# Patient Record
Sex: FEMALE | Race: BLACK OR AFRICAN AMERICAN | NOT HISPANIC OR LATINO | Employment: UNEMPLOYED | URBAN - METROPOLITAN AREA
[De-identification: names, ages, dates, MRNs, and addresses within clinical notes are randomized per-mention and may not be internally consistent; named-entity substitution may affect disease eponyms.]

---

## 2017-09-27 DIAGNOSIS — M76.72 PERONEAL TENDINITIS OF LEFT LOWER LEG: Primary | ICD-10-CM

## 2017-10-26 ENCOUNTER — HOSPITAL ENCOUNTER (OUTPATIENT)
Dept: PREADMISSION TESTING | Facility: HOSPITAL | Age: 42
Discharge: HOME | End: 2017-10-26
Attending: OBSTETRICS & GYNECOLOGY
Payer: MEDICAID

## 2017-10-26 ENCOUNTER — ANESTHESIA EVENT (OUTPATIENT)
Dept: SURGERY | Facility: HOSPITAL | Age: 42
End: 2017-10-26
Payer: MEDICAID

## 2017-10-26 VITALS
RESPIRATION RATE: 16 BRPM | DIASTOLIC BLOOD PRESSURE: 62 MMHG | BODY MASS INDEX: 33.66 KG/M2 | OXYGEN SATURATION: 99 % | SYSTOLIC BLOOD PRESSURE: 139 MMHG | HEART RATE: 44 BPM | WEIGHT: 190 LBS | HEIGHT: 63 IN

## 2017-10-26 DIAGNOSIS — I10 ESSENTIAL HYPERTENSION: ICD-10-CM

## 2017-10-26 DIAGNOSIS — I21.4 NON-ST ELEVATION (NSTEMI) MYOCARDIAL INFARCTION: ICD-10-CM

## 2017-10-26 RX ORDER — METFORMIN HYDROCHLORIDE 500 MG/1
500 TABLET ORAL 2 TIMES DAILY WITH MEALS
COMMUNITY

## 2017-10-26 RX ORDER — SODIUM CHLORIDE, SODIUM LACTATE, POTASSIUM CHLORIDE, CALCIUM CHLORIDE 600; 310; 30; 20 MG/100ML; MG/100ML; MG/100ML; MG/100ML
INJECTION, SOLUTION INTRAVENOUS CONTINUOUS
Status: CANCELLED | OUTPATIENT
Start: 2017-10-26

## 2017-10-26 RX ORDER — ASPIRIN 81 MG/1
81 TABLET ORAL DAILY
COMMUNITY

## 2017-10-26 RX ORDER — LIDOCAINE HYDROCHLORIDE 10 MG/ML
1 INJECTION, SOLUTION EPIDURAL; INFILTRATION; INTRACAUDAL; PERINEURAL ONCE
Status: CANCELLED | OUTPATIENT
Start: 2017-10-26 | End: 2017-10-26

## 2017-10-26 NOTE — PRE-PROCEDURE INSTRUCTIONS
[facility] - [phone]    Allergies, medical, surgical, family and psychosocial histories reviewed with patient. Periop plan of care reviewed. Preop instructions given, including medications to take and to hold. Time allotted for questions to be addressed.  Patient verbalized understanding.

## 2017-10-26 NOTE — ANESTHESIA PREPROCEDURE EVALUATION
10/26/2017  Isaura Yarbrough is a 42 y.o., female is scheduled for hysteroscopy with endometrial ablation under GETA on 10/31/2017.    Outside Cardiology note with EKG NSR reviewed from 9//07/2017, 2D echo w/CFD 3/2016 EF 60-65% reviewed and in patient chart.  Additional NM stress test with [facility] from 2014 reviewed in pt chart.    Past Surgical History:   Procedure Laterality Date    CARDIAC CATHETERIZATION  2014    [facility]    CHOLECYSTECTOMY  2012    FOOT SURGERY Left      BP Readings from Last 3 Encounters:   10/26/17 139/62     Anesthesia Evaluation    I have reviewed the Patient Summary Reports.    I have reviewed the Nursing Notes.   I have reviewed the Medications.     Review of Systems  Anesthesia Hx:  No problems with previous Anesthesia  History of prior surgery of interest to airway management or planning: Previous anesthesia: General  Denies Personal Hx of Anesthesia complications.   Social:  Non-Smoker, No Alcohol Use    Hematology/Oncology:  Hematology Normal      Hematology Comments: On ASA for secondary prevention; pt will stop ASA starting 10/26/2017 and resume 24 hours after procedure; pt verbalized understanding   EENT/Dental:EENT/Dental Normal   Cardiovascular:   Exercise tolerance: good Hypertension, well controlled Past MI (outside NM stress test 2014 in pt chart) CAD   Denies CABG/stent.  Denies Dysrhythmias.   Denies Angina.     ECG has been reviewed.  Functional Capacity 3 METS    Pulmonary:   Denies Shortness of breath.    Renal/:  Renal/ Normal     Hepatic/GI:  Hepatic/GI Normal    OB/GYN/PEDS:  Heavy uterine bleeding   Neurological:  Neurology Normal Denies Seizures.    Endocrine:   Diabetes, type 2  Diabetes, Type 2 Diabetes , controlled by oral hypoglycemics. on does not recall last A1c.        Physical Exam  General:  Obesity    Airway/Jaw/Neck:  Airway Findings: Mouth Opening:  Normal Tongue: Normal  General Airway Assessment: Adult  Mallampati: II  TM Distance: Normal, at least 6 cm        Eyes/Ears/Nose:  EYES/EARS/NOSE FINDINGS: Normal   Dental:  Dental Findings: Periodontal disease, Mild   Chest/Lungs:  Chest/Lungs Clear    Heart/Vascular:  Heart Findings: Normal Heart murmur: negative    Abdomen:  Abdomen Findings: Normal      Mental Status:  Mental Status Findings: Normal        Anesthesia Plan  Type of Anesthesia, risks & benefits discussed:  Anesthesia Type:  general  Patient's Preference:   Intra-op Monitoring Plan:   Intra-op Monitoring Plan Comments:   Post Op Pain Control Plan:   Post Op Pain Control Plan Comments:   Induction:   IV  Beta Blocker:  Patient is on a Beta-Blocker and has not received dose within the past 24 hours due to non-compliance or for other reasons (Patient should receive a perioperative dose or document why it is withheld).       Informed Consent: Patient understands risks and agrees with Anesthesia plan.  Questions answered.   ASA Score: 3     Day of Surgery Review of History & Physical: I have interviewed and examined the patient. I have reviewed the patient's H&P dated:        Anesthesia Plan Notes: Anesthesia consent will be obtained prior to procedure on 10/31/2017.    Addendum 10/30/2017 1613:  Outside Cardiology note with EKG NSR reviewed from 9//07/2017, 2D echo w/CFD 3/2016 EF 60-65% reviewed and in patient chart.  Additional NM stress test with [facility] from 2014 reviewed in pt chart.           Ready For Surgery From Anesthesia Perspective.

## 2017-10-26 NOTE — PRE-PROCEDURE INSTRUCTIONS
Message left for Dr. Nielsen's nurse to let Dr. Nielsen know that the pt needs cardiac clearance and that the pt is allergic to Penicillin.  Waiting on return call

## 2017-10-26 NOTE — DISCHARGE INSTRUCTIONS
Your surgery is scheduled for 10/31/17.    Please report to Outpatient Surgery Intake Office on the 2nd FLOOR at 5:30 a.m.          INSTRUCTIONS IMPORTANT!!!  ¨ Do not eat or drink after 12 midnight-including water. OK to brush teeth, no   gum, candy or mints!    ¨ Take only these medicines with a small swallow of water-morning of surgery: Blood pressure medications        ____  Proceed to [facility] on 10/26/17 for additional blood test.        ____  Do not wear makeup, including mascara.  ____  No powder, lotions or creams to surgical area.  ____  Please remove all jewelry, including piercings and leave at home.  ____  No money or valuables needed. Please leave at home.  ____  Please bring any documents given by your doctor.  ____  If going home the same day, arrange for a ride home. You will not be able to             drive if Anesthesia was used.  ____  Wear loose fitting clothing. Allow for dressings, bandages.  ____  Stop Aspirin, Ibuprofen, Motrin and Aleve at least 3-5 days before surgery, unless otherwise instructed by your doctor, or the nurse.   You MAY use Tylenol/acetaminophen until day of surgery.  ____  If you take diabetic medication, do not take am of surgery unless instructed by Doctor.  ____  Call MD for temperature above 101 degrees.  ____ Stop taking any Fish Oil supplement or any Vitamins that contain Vitamin E at least 5 days prior to surgery.  ____ Do Not wear your contact lenses the day of your procedure.  You may wear your glasses.        I have read or had read and explained to me, and understand the above information.  Additional comments or instructions:  For additional questions call [phone]     Gargle with Listerine twice a day for 3 days prior to surgery, including the morning of surgery.      Endometrial Ablation  Endometrial ablation is an outpatient surgery that can reduce or stop heavy menstrual bleeding. Ablation destroys the lining of the uterus. This surgery is  for women who do not want to have any more children and who have not yet entered menopause. It should not be used by women with endometrial hyperplasia or cancer of the uterus.  Treatment takes less than an hour, and you can go home later that day.  Preparing for surgery  · You may be given medicine by mouth or injection for a few weeks or months before your ablation. This thins the lining of the uterus and reduces bleeding.  · Your health care provider may recommend other procedures to check the inside of your uterus before the ablation is done.   · The day before surgery, you may be given medicine or a special substance (laminaria) may be put into your cervix (the opening to the uterus). This widens the opening.  · To help prevent problems with anesthesia, do not eat or drink anything 10 hours before surgery.  Your surgery     Destroying the lining with heat, freezing, or electric current prevents the lining from growing back.      · Youll be given anesthesia so you stay comfortable and relaxed and feel no pain during surgery.  · Then, your uterus may be filled with fluid. This puts pressure on the lining to help reduce bleeding. It also allows your health care provider to see inside your uterus.  · Next your health care provider puts a small telescope-like instrument through the cervix. This scope may be connected to a video monitor. This helps your health care provider see and control the ablation process. At the end of the scope, a device using heat, freezing, or electric current destroys the uterine lining. Instead of the scope, your health care provider may use a device that both expands and destroys the uterine lining. After being inserted into your uterus, it also uses heat or other energy to destroy the lining. Your health care provider will choose the device thats best for you.  Your recovery  · You may have cramping or aching in your abdomen after surgery. Your health care provider can give you pain  medicine.  · You may also have a bloody or watery discharge or bleeding for days or weeks. Use sanitary pads, not tampons.  · Dont have sexual intercourse or play active sports for 2 weeks after surgery.  · You can likely return to work in 2 days.  · Ask your health care provider about using contraception after an ablation.  · Your health care provider will see you in about 6 weeks to be sure youre healing well.  Call your health care provider if you have any of the following after surgery:  · Persistent or increased abdominal pain  · Shortness of breath  · Heavy vaginal bleeding  · Fever over 100.4°F (38°C) or chills  · Nausea  · Frequent urination for 24 hours   Date Last Reviewed: 5/10/2015  © 1682-8064 [employer]. [address] [place] [address], [place] [address]. All rights reserved. This information is not intended as a substitute for professional medical care. Always follow your healthcare professional's instructions.

## 2017-10-30 NOTE — PLAN OF CARE
Follow up call to Dr Nielsen's office. They are unable to find message. Contact will speak with Dr Nielsen when he comes in this am.

## 2017-10-31 ENCOUNTER — SURGERY (OUTPATIENT)
Age: 42
End: 2017-10-31

## 2017-10-31 ENCOUNTER — ANESTHESIA (OUTPATIENT)
Dept: SURGERY | Facility: HOSPITAL | Age: 42
End: 2017-10-31
Payer: MEDICAID

## 2017-10-31 ENCOUNTER — HOSPITAL ENCOUNTER (OUTPATIENT)
Facility: HOSPITAL | Age: 42
Discharge: HOME | End: 2017-10-31
Attending: OBSTETRICS & GYNECOLOGY | Admitting: OBSTETRICS & GYNECOLOGY
Payer: MEDICAID

## 2017-10-31 VITALS
HEART RATE: 84 BPM | WEIGHT: 190 LBS | RESPIRATION RATE: 18 BRPM | TEMPERATURE: 98 F | SYSTOLIC BLOOD PRESSURE: 140 MMHG | HEIGHT: 63 IN | DIASTOLIC BLOOD PRESSURE: 92 MMHG | BODY MASS INDEX: 33.66 KG/M2 | OXYGEN SATURATION: 96 %

## 2017-10-31 DIAGNOSIS — N92.1 MENOMETRORRHAGIA: ICD-10-CM

## 2017-10-31 LAB — POCT GLUCOSE: 126 MG/DL (ref 70–110)

## 2017-10-31 PROCEDURE — 37000009 HC ANESTHESIA EA ADD 15 MINS: Performed by: OBSTETRICS & GYNECOLOGY

## 2017-10-31 PROCEDURE — 37000008 HC ANESTHESIA 1ST 15 MINUTES: Performed by: OBSTETRICS & GYNECOLOGY

## 2017-10-31 PROCEDURE — 63600175 PHARM REV CODE 636 W HCPCS: Performed by: NURSE ANESTHETIST, CERTIFIED REGISTERED

## 2017-10-31 PROCEDURE — 71000039 HC RECOVERY, EACH ADD'L HOUR: Performed by: OBSTETRICS & GYNECOLOGY

## 2017-10-31 PROCEDURE — 71000033 HC RECOVERY, INTIAL HOUR: Performed by: OBSTETRICS & GYNECOLOGY

## 2017-10-31 PROCEDURE — 36000707: Performed by: OBSTETRICS & GYNECOLOGY

## 2017-10-31 PROCEDURE — 27201423 OPTIME MED/SURG SUP & DEVICES STERILE SUPPLY: Performed by: OBSTETRICS & GYNECOLOGY

## 2017-10-31 PROCEDURE — 25000003 PHARM REV CODE 250: Performed by: NURSE PRACTITIONER

## 2017-10-31 PROCEDURE — 25000003 PHARM REV CODE 250: Performed by: OBSTETRICS & GYNECOLOGY

## 2017-10-31 PROCEDURE — 88305 TISSUE EXAM BY PATHOLOGIST: CPT | Performed by: PATHOLOGY

## 2017-10-31 PROCEDURE — 88305 TISSUE EXAM BY PATHOLOGIST: CPT | Mod: 26,,, | Performed by: PATHOLOGY

## 2017-10-31 PROCEDURE — 71000015 HC POSTOP RECOV 1ST HR: Performed by: OBSTETRICS & GYNECOLOGY

## 2017-10-31 PROCEDURE — 63600175 PHARM REV CODE 636 W HCPCS

## 2017-10-31 PROCEDURE — 36000706: Performed by: OBSTETRICS & GYNECOLOGY

## 2017-10-31 PROCEDURE — 25000003 PHARM REV CODE 250: Performed by: ANESTHESIOLOGY

## 2017-10-31 PROCEDURE — 63600175 PHARM REV CODE 636 W HCPCS: Performed by: ANESTHESIOLOGY

## 2017-10-31 RX ORDER — DIPHENHYDRAMINE HCL 25 MG
25 CAPSULE ORAL EVERY 4 HOURS PRN
Status: DISCONTINUED | OUTPATIENT
Start: 2017-10-31 | End: 2017-10-31 | Stop reason: HOSPADM

## 2017-10-31 RX ORDER — DIPHENHYDRAMINE HYDROCHLORIDE 50 MG/ML
25 INJECTION INTRAMUSCULAR; INTRAVENOUS EVERY 6 HOURS PRN
Status: DISCONTINUED | OUTPATIENT
Start: 2017-10-31 | End: 2017-10-31 | Stop reason: HOSPADM

## 2017-10-31 RX ORDER — SODIUM CHLORIDE, SODIUM LACTATE, POTASSIUM CHLORIDE, CALCIUM CHLORIDE 600; 310; 30; 20 MG/100ML; MG/100ML; MG/100ML; MG/100ML
INJECTION, SOLUTION INTRAVENOUS CONTINUOUS
Status: DISCONTINUED | OUTPATIENT
Start: 2017-10-31 | End: 2017-10-31 | Stop reason: HOSPADM

## 2017-10-31 RX ORDER — KETOROLAC TROMETHAMINE 30 MG/ML
INJECTION, SOLUTION INTRAMUSCULAR; INTRAVENOUS
Status: DISCONTINUED | OUTPATIENT
Start: 2017-10-31 | End: 2017-10-31

## 2017-10-31 RX ORDER — SODIUM CHLORIDE 0.9 % (FLUSH) 0.9 %
3 SYRINGE (ML) INJECTION
Status: DISCONTINUED | OUTPATIENT
Start: 2017-10-31 | End: 2017-10-31 | Stop reason: HOSPADM

## 2017-10-31 RX ORDER — HYDROCODONE BITARTRATE AND ACETAMINOPHEN 5; 325 MG/1; MG/1
1 TABLET ORAL EVERY 4 HOURS PRN
Status: DISCONTINUED | OUTPATIENT
Start: 2017-10-31 | End: 2017-10-31 | Stop reason: HOSPADM

## 2017-10-31 RX ORDER — LIDOCAINE HCL/PF 100 MG/5ML
SYRINGE (ML) INTRAVENOUS
Status: DISCONTINUED | OUTPATIENT
Start: 2017-10-31 | End: 2017-10-31

## 2017-10-31 RX ORDER — LIDOCAINE HYDROCHLORIDE 10 MG/ML
1 INJECTION, SOLUTION EPIDURAL; INFILTRATION; INTRACAUDAL; PERINEURAL ONCE
Status: DISCONTINUED | OUTPATIENT
Start: 2017-10-31 | End: 2017-10-31 | Stop reason: HOSPADM

## 2017-10-31 RX ORDER — ONDANSETRON 8 MG/1
8 TABLET, ORALLY DISINTEGRATING ORAL EVERY 8 HOURS PRN
Status: DISCONTINUED | OUTPATIENT
Start: 2017-10-31 | End: 2017-10-31 | Stop reason: HOSPADM

## 2017-10-31 RX ORDER — OXYCODONE HYDROCHLORIDE 5 MG/1
5 TABLET ORAL
Status: DISCONTINUED | OUTPATIENT
Start: 2017-10-31 | End: 2017-10-31 | Stop reason: HOSPADM

## 2017-10-31 RX ORDER — ISOSORBIDE MONONITRATE 60 MG/1
60 TABLET, EXTENDED RELEASE ORAL DAILY
COMMUNITY

## 2017-10-31 RX ORDER — MIDAZOLAM HYDROCHLORIDE 1 MG/ML
INJECTION, SOLUTION INTRAMUSCULAR; INTRAVENOUS
Status: DISCONTINUED | OUTPATIENT
Start: 2017-10-31 | End: 2017-10-31

## 2017-10-31 RX ORDER — ONDANSETRON 2 MG/ML
INJECTION INTRAMUSCULAR; INTRAVENOUS
Status: DISCONTINUED | OUTPATIENT
Start: 2017-10-31 | End: 2017-10-31

## 2017-10-31 RX ORDER — SILVER NITRATE 38.21; 12.74 MG/1; MG/1
STICK TOPICAL
Status: DISCONTINUED | OUTPATIENT
Start: 2017-10-31 | End: 2017-10-31 | Stop reason: HOSPADM

## 2017-10-31 RX ORDER — ATORVASTATIN CALCIUM 20 MG/1
20 TABLET, FILM COATED ORAL DAILY
COMMUNITY

## 2017-10-31 RX ORDER — ONDANSETRON 2 MG/ML
4 INJECTION INTRAMUSCULAR; INTRAVENOUS DAILY PRN
Status: DISCONTINUED | OUTPATIENT
Start: 2017-10-31 | End: 2017-10-31 | Stop reason: HOSPADM

## 2017-10-31 RX ORDER — HYDROMORPHONE HYDROCHLORIDE 2 MG/ML
0.5 INJECTION, SOLUTION INTRAMUSCULAR; INTRAVENOUS; SUBCUTANEOUS EVERY 5 MIN PRN
Status: COMPLETED | OUTPATIENT
Start: 2017-10-31 | End: 2017-10-31

## 2017-10-31 RX ORDER — HYDROMORPHONE HYDROCHLORIDE 2 MG/ML
INJECTION, SOLUTION INTRAMUSCULAR; INTRAVENOUS; SUBCUTANEOUS
Status: COMPLETED
Start: 2017-10-31 | End: 2017-10-31

## 2017-10-31 RX ORDER — DIPHENHYDRAMINE HYDROCHLORIDE 50 MG/ML
25 INJECTION INTRAMUSCULAR; INTRAVENOUS EVERY 4 HOURS PRN
Status: DISCONTINUED | OUTPATIENT
Start: 2017-10-31 | End: 2017-10-31 | Stop reason: HOSPADM

## 2017-10-31 RX ORDER — FENTANYL CITRATE 50 UG/ML
INJECTION, SOLUTION INTRAMUSCULAR; INTRAVENOUS
Status: DISCONTINUED | OUTPATIENT
Start: 2017-10-31 | End: 2017-10-31

## 2017-10-31 RX ORDER — PROPOFOL 10 MG/ML
VIAL (ML) INTRAVENOUS
Status: DISCONTINUED | OUTPATIENT
Start: 2017-10-31 | End: 2017-10-31

## 2017-10-31 RX ADMIN — HYDROMORPHONE HYDROCHLORIDE 0.5 MG: 2 INJECTION, SOLUTION INTRAMUSCULAR; INTRAVENOUS; SUBCUTANEOUS at 10:10

## 2017-10-31 RX ADMIN — SODIUM CHLORIDE, SODIUM LACTATE, POTASSIUM CHLORIDE, AND CALCIUM CHLORIDE: .6; .31; .03; .02 INJECTION, SOLUTION INTRAVENOUS at 06:10

## 2017-10-31 RX ADMIN — FENTANYL CITRATE 25 MCG: 50 INJECTION, SOLUTION INTRAMUSCULAR; INTRAVENOUS at 09:10

## 2017-10-31 RX ADMIN — PROPOFOL 200 MG: 10 INJECTION, EMULSION INTRAVENOUS at 09:10

## 2017-10-31 RX ADMIN — FENTANYL CITRATE 50 MCG: 50 INJECTION, SOLUTION INTRAMUSCULAR; INTRAVENOUS at 09:10

## 2017-10-31 RX ADMIN — KETOROLAC TROMETHAMINE 30 MG: 30 INJECTION, SOLUTION INTRAMUSCULAR; INTRAVENOUS at 10:10

## 2017-10-31 RX ADMIN — PROMETHAZINE HYDROCHLORIDE 6.25 MG: 25 INJECTION INTRAMUSCULAR; INTRAVENOUS at 11:10

## 2017-10-31 RX ADMIN — SILVER NITRATE 2 APPLICATOR: 38.21; 12.74 STICK TOPICAL at 10:10

## 2017-10-31 RX ADMIN — MIDAZOLAM 2 MG: 1 INJECTION INTRAMUSCULAR; INTRAVENOUS at 09:10

## 2017-10-31 RX ADMIN — FENTANYL CITRATE 25 MCG: 50 INJECTION, SOLUTION INTRAMUSCULAR; INTRAVENOUS at 10:10

## 2017-10-31 RX ADMIN — LIDOCAINE HYDROCHLORIDE 80 MG: 20 INJECTION, SOLUTION INTRAVENOUS at 09:10

## 2017-10-31 RX ADMIN — ONDANSETRON 8 MG: 2 INJECTION, SOLUTION INTRAMUSCULAR; INTRAVENOUS at 09:10

## 2017-10-31 NOTE — PLAN OF CARE
Received fromOR per stretcher. Monitors and O2 applied. See flow sheets. Rouses to voice. VSS. See flow sheets. No family in building when called.

## 2017-10-31 NOTE — OP NOTE
DATE OF PROCEDURE:  10/31/2017    PREOPERATIVE DIAGNOSIS:  Menometrorrhagia.    POSTOPERATIVE DIAGNOSIS:  Menometrorrhagia.    PROCEDURE:  D and C, hysteroscopy, endometrial ablation.    SURGEON:  Joaquin Nielsen M.D.    ANESTHESIA:  General.    DESCRIPTION OF PROCEDURE:  The patient was brought to the Operating Room,   prepped and draped in sterile fashion.  The cervix was grasped with single-tooth   tenaculum.  The cavity length was sounded to approximately 4 cm.  The cervix   dilated using graduated dilators.  Diagnostic hysteroscopy performed.  The   uterus was within normal limits.  Sharp curettage was performed and specimen   sent to pathology:  Endometrial ablation was then performed using NovaSure   device as per 's instructions.  No complications noted.  Procedure   was completed.  All instruments were removed from the cervix.  Good hemostasis   noted.  The patient extubated in the Operating Room and taken to Recovery in   stable condition.  There were no complications.  EBL for the case was minimal.      VG/IN  dd: 10/31/2017 11:44:59 (CDT)  td: 10/31/2017 14:09:50 (CDT)  Doc ID   #[id number]  Job ID #[id number]    CC:

## 2017-10-31 NOTE — PLAN OF CARE
Signed out from pacu per Dr Ortega. Report called to Lupis Juan/ops. Transported to ops via stretcher,sr upx2.

## 2017-10-31 NOTE — TRANSFER OF CARE
"Anesthesia Transfer of Care Note    Patient: Isaura Yarbrough    Procedure(s) Performed: Procedure(s) (LRB):  ABLATION-ENDOMETRIAL (Bilateral)  HYSTEROSCOPY (Bilateral)    Patient location: PACU    Anesthesia Type: general    Transport from OR: Transported from OR on 6-10 L/min O2 by face mask with adequate spontaneous ventilation    Post pain: adequate analgesia    Post assessment: no apparent anesthetic complications    Post vital signs: stable    Level of consciousness: awake, alert and oriented    Nausea/Vomiting: no nausea/vomiting    Complications: none    Transfer of care protocol was followed      Last vitals:   Visit Vitals  /66 (BP Location: Right arm, Patient Position: Lying)   Pulse 67   Temp 36.8 °C (98.2 °F) (Skin)   Resp 18   Ht 5' 3" (1.6 m)   Wt 86.2 kg (190 lb)   LMP 09/04/2017   SpO2 97%   Breastfeeding? No   BMI 33.66 kg/m²     "

## 2017-10-31 NOTE — PLAN OF CARE
VSS. Meets criteria for discharge. IV discontinued. With tip intact. Discharge teaching done, time allowed for questions. Verbalizes understanding.

## 2017-10-31 NOTE — ANESTHESIA POSTPROCEDURE EVALUATION
"Anesthesia Post Evaluation    Patient: Isaura Yarbrough    Procedure(s) Performed: Procedure(s) (LRB):  ABLATION-ENDOMETRIAL (Bilateral)  HYSTEROSCOPY (Bilateral)    Final Anesthesia Type: general  Patient location during evaluation: PACU  Level of consciousness: awake  Post-procedure vital signs: reviewed and stable  Pain management: adequate  Airway patency: patent    Anesthetic complications: no      Cardiovascular status: stable  Respiratory status: room air  Hydration status: euvolemic  Follow-up not needed.        Visit Vitals  /79   Pulse 92   Temp 36.6 °C (97.9 °F) (Skin)   Resp (!) 22   Ht 5' 3" (1.6 m)   Wt 86.2 kg (190 lb)   LMP 09/04/2017   SpO2 95%   Breastfeeding? No   BMI 33.66 kg/m²       Pain/Fercho Score: Pain Assessment Performed: Yes (10/31/2017 10:20 AM)  Presence of Pain: denies (10/31/2017 10:20 AM)  Pain Rating Prior to Med Admin: 9 (10/31/2017 10:45 AM)  Fercho Score: 8 (10/31/2017 10:20 AM)  Modified Fercho Score: 18 (10/31/2017 10:20 AM)      "

## 2017-10-31 NOTE — BRIEF OP NOTE
[facility]  Brief Operative Note     SUMMARY     Surgery Date: 10/31/2017     Surgeon(s) and Role:     * Joaquin Nielsen MD - Primary    Assisting Surgeon: None    Pre-op Diagnosis:  Menorrhagia [N92.0]    Post-op Diagnosis:  Post-Op Diagnosis Codes:     * Menorrhagia [N92.0]    Procedure(s) (LRB):  ABLATION-ENDOMETRIAL (Bilateral)  HYSTEROSCOPY (Bilateral)    Anesthesia: General    Description of the findings of the procedure: D+C, Hysterosocopy, Endometrial ablation    Findings/Key Components: nl uterus    Estimated Blood Loss: * No values recorded between 10/31/2017  9:56 AM and 10/31/2017 10:17 AM *         Specimens:   Specimen (12h ago through future)    Start     Ordered    10/31/17 1011  Specimen to Pathology - Surgery  Once     Comments:  Endometrial currettings - perm      10/31/17 1010          Discharge Note    SUMMARY     Admit Date: 10/31/2017    Discharge Date and Time:  10/31/2017 10:28 AM    Hospital Course (synopsis of major diagnoses, care, treatment, and services provided during the course of the hospital stay): nl post op     Final Diagnosis: Post-Op Diagnosis Codes:     * Menorrhagia [N92.0]    Disposition: Home or Self Care    Follow Up/Patient Instructions:     Medications:  Reconciled Home Medications:   Current Discharge Medication List      CONTINUE these medications which have NOT CHANGED    Details   aspirin (ECOTRIN) 81 MG EC tablet Take 81 mg by mouth once daily.      atorvastatin (LIPITOR) 20 MG tablet Take 20 mg by mouth once daily.      isosorbide mononitrate (IMDUR) 60 MG 24 hr tablet Take 60 mg by mouth once daily.      metFORMIN (GLUCOPHAGE) 500 MG tablet Take 500 mg by mouth 2 (two) times daily with meals.             Discharge Procedure Orders  Diet general       Follow-up Information     Joaquin Nielsen MD In 4 weeks.    Specialty:  Obstetrics  Contact information:  [address] [facility] [place] [address] [phone]

## 2017-10-31 NOTE — DISCHARGE INSTRUCTIONS
Endometrial Ablation  Endometrial ablation is an outpatient surgery that can reduce or stop heavy menstrual bleeding. Ablation destroys the lining of the uterus. This surgery is for women who do not want to have any more children and who have not yet entered menopause. It should not be used by women with endometrial hyperplasia or cancer of the uterus.  Treatment takes less than an hour, and you can go home later that day.  Preparing for surgery  · You may be given medicine by mouth or injection for a few weeks or months before your ablation. This thins the lining of the uterus and reduces bleeding.  · Your health care provider may recommend other procedures to check the inside of your uterus before the ablation is done.   · The day before surgery, you may be given medicine or a special substance (laminaria) may be put into your cervix (the opening to the uterus). This widens the opening.  · To help prevent problems with anesthesia, do not eat or drink anything 10 hours before surgery.  Your surgery     Destroying the lining with heat, freezing, or electric current prevents the lining from growing back.      · Youll be given anesthesia so you stay comfortable and relaxed and feel no pain during surgery.  · Then, your uterus may be filled with fluid. This puts pressure on the lining to help reduce bleeding. It also allows your health care provider to see inside your uterus.  · Next your health care provider puts a small telescope-like instrument through the cervix. This scope may be connected to a video monitor. This helps your health care provider see and control the ablation process. At the end of the scope, a device using heat, freezing, or electric current destroys the uterine lining. Instead of the scope, your health care provider may use a device that both expands and destroys the uterine lining. After being inserted into your uterus, it also uses heat or other energy to destroy the lining. Your health care  provider will choose the device thats best for you.  Your recovery  · You may have cramping or aching in your abdomen after surgery. Your health care provider can give you pain medicine.  · You may also have a bloody or watery discharge or bleeding for days or weeks. Use sanitary pads, not tampons.  · Dont have sexual intercourse or play active sports for 2 weeks after surgery.  · You can likely return to work in 2 days.  · Ask your health care provider about using contraception after an ablation.  · Your health care provider will see you in about 6 weeks to be sure youre healing well.  Call your health care provider if you have any of the following after surgery:  · Persistent or increased abdominal pain  · Shortness of breath  · Heavy vaginal bleeding  · Fever over 100.4°F (38°C) or chills  · Nausea  · Frequent urination for 24 hours   Date Last Reviewed: 5/10/2015  © 6366-7592 [employer]. [address] [place] [address], [place] [address]. All rights reserved. This information is not intended as a substitute for professional medical care. Always follow your healthcare professional's instructions.

## 2021-04-22 ENCOUNTER — TELEPHONE (OUTPATIENT)
Dept: OTOLARYNGOLOGY | Facility: CLINIC | Age: 46
End: 2021-04-22

## 2021-04-26 ENCOUNTER — TELEPHONE (OUTPATIENT)
Dept: OTOLARYNGOLOGY | Facility: CLINIC | Age: 46
End: 2021-04-26

## 2022-12-24 ENCOUNTER — HOSPITAL ENCOUNTER (EMERGENCY)
Facility: HOSPITAL | Age: 47
Discharge: HOME | End: 2022-12-24
Attending: EMERGENCY MEDICINE
Payer: MEDICAID

## 2022-12-24 VITALS
BODY MASS INDEX: 35.26 KG/M2 | HEART RATE: 101 BPM | DIASTOLIC BLOOD PRESSURE: 83 MMHG | HEIGHT: 63 IN | WEIGHT: 199 LBS | SYSTOLIC BLOOD PRESSURE: 149 MMHG | OXYGEN SATURATION: 100 % | RESPIRATION RATE: 20 BRPM | TEMPERATURE: 98 F

## 2022-12-24 DIAGNOSIS — U07.1 COVID: Primary | ICD-10-CM

## 2022-12-24 DIAGNOSIS — J02.9 SORE THROAT: ICD-10-CM

## 2022-12-24 DIAGNOSIS — U07.1 COVID-19 VIRUS DETECTED: ICD-10-CM

## 2022-12-24 LAB — SARS-COV-2 RDRP RESP QL NAA+PROBE: POSITIVE

## 2022-12-24 PROCEDURE — U0002 COVID-19 LAB TEST NON-CDC: HCPCS | Mod: ER

## 2022-12-24 PROCEDURE — 99284 EMERGENCY DEPT VISIT MOD MDM: CPT | Mod: ER

## 2022-12-24 RX ORDER — CHLORHEXIDINE GLUCONATE ORAL RINSE 1.2 MG/ML
15 SOLUTION DENTAL 2 TIMES DAILY
Qty: 420 ML | Refills: 0 | Status: SHIPPED | OUTPATIENT
Start: 2022-12-24 | End: 2023-01-07

## 2022-12-24 NOTE — Clinical Note
"Isaura Narayanannader Yarbrough was seen and treated in our emergency department on 12/24/2022.  She may return to work on 12/27/2022.       If you have any questions or concerns, please don't hesitate to call.      Yasmine Blanchard PA-C"

## 2022-12-24 NOTE — DISCHARGE INSTRUCTIONS
-Follow up with primary care doctor and return to the ER if you are experiencing any worsening of symptoms    Thank you for letting myself and our team take care for you today! It was nice meeting you, and I hope you feel better very soon. Please come back to [facility] for all of your future medical needs.   Our goal in the ER is to always give you outstanding care and exceptional service. You may receive a survey by mail or email in the next week about your experience in our ED. We would greatly appreciate you completing and returning the survey. Your feedback provides us with a way to recognize our staff who give very good care and it helps us learn how to improve when your experience was below our aspiration of excellence.     Sincerely,     Yasmine Blanchard PA-C  Emergency Room Physician Assistant  [facility]

## 2022-12-24 NOTE — ED PROVIDER NOTES
Encounter Date: 12/24/2022       History     Chief Complaint   Patient presents with    Sore Throat     Pt reports sore throat since last night. Denies any other symptoms. States boyfriend has covid.      Patient is a 47-year-old female presents to ED with sore throat onset yesterday.  Patient reports her boyfriend tested positive for COVID yesterday.  She denies congestion, cough, fevers, nausea, vomiting or diarrhea.    A ten point review of systems was completed and is negative except as documented above.  Patient denies any other acute medical complaint.    The patients available PMH, PSH, Social History, medications, allergies, and triage vital signs were reviewed just prior to their medical evaluation.      The history is provided by the patient.   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 No pertinent family history.  Social History     Tobacco Use    Smoking status: Never    Smokeless tobacco: Never   Substance Use Topics    Alcohol use: Yes     Comment: occasional    Drug use: Never     Review of Systems   Constitutional:  Negative for fever.   HENT:  Positive for sore throat.    Respiratory:  Negative for shortness of breath.    Cardiovascular:  Negative for chest pain.   Gastrointestinal:  Negative for nausea.   Genitourinary:  Negative for dysuria.   Musculoskeletal:  Negative for back pain.   Skin:  Negative for rash.   Neurological:  Negative for weakness.   Hematological:  Does not bruise/bleed easily.      Physical Exam     Initial Vitals [12/24/22 1333]   BP Pulse Resp Temp SpO2   (!) 149/83 101 20 98.2 °F (36.8 °C) 100 %      MAP       --         Physical Exam    Nursing note and vitals reviewed.  Constitutional: She appears well-developed and well-nourished. No distress.   HENT:   Head: Normocephalic and atraumatic.   Right Ear: Tympanic membrane, external ear and ear canal normal.   Left Ear: Tympanic membrane, external ear and ear canal normal.   Nose: Nose normal.   Mouth/Throat: Uvula is midline, oropharynx is clear and moist and mucous membranes are normal. No oropharyngeal exudate.   Eyes: EOM are normal. Pupils are equal, round, and reactive to light. Right eye exhibits no discharge. Left eye exhibits no discharge.   Neck: Neck supple.   Normal range of motion.  Cardiovascular:  Normal rate and regular rhythm.           Pulmonary/Chest: Breath sounds normal. No respiratory distress. She has no wheezes. She has no rales.   Musculoskeletal:         General: Normal range of motion.      Cervical back: Normal range of motion and neck supple.     Neurological: She is alert and oriented to person, place, and time.   Skin: Skin is warm and dry.   Psychiatric: She has a normal mood and affect. Her behavior is normal.       ED Course   Procedures  Labs Reviewed   SARS-COV-2 RNA AMPLIFICATION, QUAL - Abnormal; Notable for the following components:       Result Value    SARS-CoV-2 RNA, Amplification, Qual Positive (*)     All other components within normal limits    Narrative:     Is the patient symptomatic?->Yes    result(s) called and verbal readback obtained from Estela Anne RN   by REMINGTON 12/24/2022 14:07          Imaging Results    None          Medications - No data to display  Medical Decision Making:   Initial Assessment:   Sore throat onset last night  Differential Diagnosis:   Differential Diagnosis includes, but is not limited to:  Viral URI, influenza, covid, Strep pharyngitis, viral pharyngitis, allergic  rhinitis    ED Management:  Sore throat  -Afebrile, vital signs stable, no apparent distress  -Covid positive    Plan:  -Paxlovid as prescribed  -Peridex as prescribed  -Alternate tylenol and motrin every 4 to 6 hours  -Stay hydrated by drinking plenty of fluids  -Patient is in stable condition to be discharged home. Advised patient to follow up with primary care doctor and return to the ED if experiencing any worsening of symptoms.                              Clinical Impression:   Final diagnoses:  [J02.9] Sore throat  [U07.1] COVID (Primary)        ED Disposition Condition    Discharge Stable          ED Prescriptions       Medication Sig Dispense Start Date End Date Auth. Provider    nirmatrelvir-ritonavir 300 mg (150 mg x 2)-100 mg copackaged tablets (EUA) Take 3 tablets by mouth 2 (two) times daily for 5 days. Each dose contains 2 nirmatrelvir (pink tablets) and 1 ritonavir (white tablet). Take all 3 tablets together 30 tablet 12/24/2022 12/29/2022 Yasmine Blanchard PA-C    chlorhexidine (PERIDEX) 0.12 % solution Use as directed 15 mLs in the mouth or throat 2 (two) times daily. for 14 days 420 mL 12/24/2022 1/7/2023 Yasmine Blanchard PA-C          Follow-up Information       Follow up With Specialties Details Why Contact Info    Cynthia Howard) MD [facility]   [address] [facility] [address]  [phone]               Yasmine Blanchard PA-C  12/24/22 1199

## 2022-12-24 NOTE — Clinical Note
"Isaura RAMOS"Arnaud Yarbrough was seen and treated in our emergency department on 12/24/2022.     COVID-19 is present in our communities across the state. There is limited testing for COVID at this time, so not all patients can be tested. In this situation, your employee meets the following criteria:    Isaura Yarbrough has met the criteria for COVID-19 testing and has a POSITIVE result. She can return to work once they are asymptomatic for 24 hours without the use of fever reducing medications AND at least five days from the first positive result. A mask is recommended for 5 days post quarantine.     If you have any questions or concerns, or if I can be of further assistance, please do not hesitate to contact me.    Sincerely,              RN"

## (undated) DEVICE — SEE MEDLINE ITEM 156955

## (undated) DEVICE — DRESSING TELFA N ADH 3X8

## (undated) DEVICE — SEE MEDLINE ITEM 157117

## (undated) DEVICE — SEE MEDLINE ITEM 157116

## (undated) DEVICE — TUBING ARTRL PRESS MNTR M-F 4

## (undated) DEVICE — DRAPE SURGICAL STERI IRRG PCH

## (undated) DEVICE — Device

## (undated) DEVICE — SEE MEDLINE ITEM 146355

## (undated) DEVICE — SYR 50CC LL

## (undated) DEVICE — CATH URETHRAL 16FR RED

## (undated) DEVICE — PAD PREP 50/CA

## (undated) DEVICE — SEE MEDLINE ITEM 154981

## (undated) DEVICE — GLOVE BIOGEL ECLIPSE SZ 7.5

## (undated) DEVICE — SEE MEDLINE ITEM 152622

## (undated) DEVICE — COVER OVERHEAD SURG LT BLUE

## (undated) DEVICE — UNDERGLOVES BIOGEL PI SIZE 8

## (undated) DEVICE — DEVICE ABLATION NOVASURE DISP